# Patient Record
Sex: FEMALE | Race: BLACK OR AFRICAN AMERICAN | NOT HISPANIC OR LATINO | Employment: FULL TIME | URBAN - METROPOLITAN AREA
[De-identification: names, ages, dates, MRNs, and addresses within clinical notes are randomized per-mention and may not be internally consistent; named-entity substitution may affect disease eponyms.]

---

## 2020-09-22 ENCOUNTER — TELEPHONE (OUTPATIENT)
Dept: OBSTETRICS AND GYNECOLOGY | Facility: CLINIC | Age: 45
End: 2020-09-22

## 2020-09-22 NOTE — TELEPHONE ENCOUNTER
Returned patient call , advised patient that she can be seen by Dr. Sainz will see this patient for her pregnancy , scheduled patient nelson        ----- Message from Christopher Lobato sent at 9/22/2020 12:23 PM CDT -----  Name of Who is Calling: ZAK GUERRERO [[id number]]    What is the request in detail: Patient is requesting a call back regards to a message ...Please contact to further discuss and advise      Can the clinic reply by [facility]: no     [facility]  [phone] (home) [phone] (work)

## 2020-09-28 ENCOUNTER — OFFICE VISIT (OUTPATIENT)
Dept: OBSTETRICS AND GYNECOLOGY | Facility: CLINIC | Age: 45
End: 2020-09-28
Attending: OBSTETRICS & GYNECOLOGY
Payer: COMMERCIAL

## 2020-09-28 VITALS
DIASTOLIC BLOOD PRESSURE: 72 MMHG | BODY MASS INDEX: 27.51 KG/M2 | HEIGHT: 62 IN | SYSTOLIC BLOOD PRESSURE: 116 MMHG | WEIGHT: 149.5 LBS

## 2020-09-28 DIAGNOSIS — O36.80X0 PREGNANCY WITH UNCERTAIN FETAL VIABILITY, SINGLE OR UNSPECIFIED FETUS: Primary | ICD-10-CM

## 2020-09-28 DIAGNOSIS — N91.2 AMENORRHEA: ICD-10-CM

## 2020-09-28 PROCEDURE — 99213 PR OFFICE/OUTPT VISIT, EST, LEVL III, 20-29 MIN: ICD-10-PCS | Mod: S$GLB,,, | Performed by: OBSTETRICS & GYNECOLOGY

## 2020-09-28 PROCEDURE — 3008F BODY MASS INDEX DOCD: CPT | Mod: CPTII,S$GLB,, | Performed by: OBSTETRICS & GYNECOLOGY

## 2020-09-28 PROCEDURE — 99999 PR PBB SHADOW E&M-EST. PATIENT-LVL III: CPT | Mod: PBBFAC,,, | Performed by: OBSTETRICS & GYNECOLOGY

## 2020-09-28 PROCEDURE — 76801 PR US, OB <14WKS, TRANSABD, SINGLE GESTATION: ICD-10-PCS | Mod: S$GLB,,, | Performed by: OBSTETRICS & GYNECOLOGY

## 2020-09-28 PROCEDURE — 3008F PR BODY MASS INDEX (BMI) DOCUMENTED: ICD-10-PCS | Mod: CPTII,S$GLB,, | Performed by: OBSTETRICS & GYNECOLOGY

## 2020-09-28 PROCEDURE — 99999 PR PBB SHADOW E&M-EST. PATIENT-LVL III: ICD-10-PCS | Mod: PBBFAC,,, | Performed by: OBSTETRICS & GYNECOLOGY

## 2020-09-28 PROCEDURE — 99213 OFFICE O/P EST LOW 20 MIN: CPT | Mod: S$GLB,,, | Performed by: OBSTETRICS & GYNECOLOGY

## 2020-09-28 PROCEDURE — 76801 OB US < 14 WKS SINGLE FETUS: CPT | Mod: S$GLB,,, | Performed by: OBSTETRICS & GYNECOLOGY

## 2020-09-28 RX ORDER — ENOXAPARIN SODIUM 100 MG/ML
30 INJECTION SUBCUTANEOUS
COMMUNITY

## 2020-09-28 RX ORDER — ESTRADIOL 0.1 MG/D
0.1 FILM, EXTENDED RELEASE TRANSDERMAL
COMMUNITY

## 2020-09-28 RX ORDER — PROGESTERONE 50 MG/ML
50 INJECTION, SOLUTION INTRAMUSCULAR
COMMUNITY

## 2020-09-28 RX ORDER — METHYLPREDNISOLONE 16 MG/1
10 TABLET ORAL
COMMUNITY

## 2020-09-28 RX ORDER — LORAZEPAM 1 MG/1
1 TABLET ORAL
COMMUNITY

## 2020-09-28 NOTE — PROGRESS NOTES
"  CC: Symptoms related to fibroids    Magui Blackmon is a 45 y.o. female  presents for a consultation for management of fibroids.      She is s/p embryo transfer of two embryo on 2020. IVF pregnancy with donor eggs. This is her second transfer. Initial transfer didn't take.     She experienced bleeding and was seen in the ED. At that time, a fetal pole was seen measuring 7w1d but no fetal heart tones were seen. She was also found to have a moderate hematoma (approximately 5 cm in size). She was seen by her [facility] the next day and was told there was no fetal pole. She still has multiple fibroids with uterus measuring approximately 20 week size.    She and her  are seeking clarity regarding the viability of the pregnancy.     Past Medical History:   Diagnosis Date    Anemia     ARDS (adult respiratory distress syndrome)     rxn of blood transfusion from myomectomy procedure    History of uterine fibroid        Past Surgical History:   Procedure Laterality Date     SECTION      CHOLECYSTECTOMY  2006    HERNIA REPAIR  [id number]    Right and left    MYOMECTOMY      abdominal  and hysterscope     MYOMECTOMY  2012    adbominal    UMBILICAL HERNIA REPAIR  2006       Family History   Problem Relation Age of Onset    Colon cancer Father     Hypertension Unknown     Diabetes Mellitus Unknown     Cancer Unknown        Social History     Tobacco Use    Smoking status: Never Smoker    Smokeless tobacco: Never Used   Substance Use Topics    Alcohol use: Yes     Alcohol/week: 0.0 standard drinks     Comment: social use only    Drug use: No       OB History    Para Term  AB Living   2 1 1 0 1 1   SAB TAB Ectopic Multiple Live Births   0 1 0 0        # Outcome Date GA Lbr Son/2nd Weight Sex Delivery Anes PTL Lv   2 TAB            1 Term                /72   Ht 5' 2" (1.575 m)   Wt 67.8 kg (149 lb 7.6 oz)   LMP 2020   BMI 27.34 kg/m² "     ROS:  GENERAL: Denies weight gain or weight loss. Feeling well overall.   SKIN: Denies rash or lesions.   HEAD: Denies head injury or headache.   NODES: Denies enlarged lymph nodes.   CHEST: Denies chest pain or shortness of breath.   CARDIOVASCULAR: Denies palpitations or left sided chest pain.   ABDOMEN: No abdominal pain, constipation, diarrhea, nausea, vomiting or rectal bleeding.   URINARY: No frequency, dysuria, hematuria, or burning on urination.  REPRODUCTIVE: See HPI.   BREASTS: The patient performs breast self-examination and denies pain, lumps, or nipple discharge.   HEMATOLOGIC: No easy bruisability or excessive bleeding with the exception of menstrual cycles.  MUSCULOSKELETAL: Denies joint pain or swelling.   NEUROLOGIC: Denies syncope or weakness.   PSYCHIATRIC: Denies depression, anxiety or mood swings.    PHYSICAL EXAM:  Deferred        ICD-10-CM ICD-9-CM    1. Pregnancy with uncertain fetal viability, single or unspecified fetus  O36.80X0 V23.87 US OB/GYN Procedure ([facility])-Future       Records reviewed. Discussed it is unclear as to the viability. Recommended a TVUS to determine.

## 2020-10-05 NOTE — PROGRESS NOTES
TVUS performed. I was present for the procedure.     CRL still measuring 7w1d (previous ultrasound approximately 1 week ago). No fetal cardiac activity visualized on 2-D sono or with color flow doppler.     Discussed with patient and her , findings are consistent with missed .    Discussed treatment options - expectant management, medical management, surgical management. R/B discussed. She plans on returning home to [place] and will follow-up with her [facility] physician in [place].